# Patient Record
Sex: FEMALE | Race: BLACK OR AFRICAN AMERICAN
[De-identification: names, ages, dates, MRNs, and addresses within clinical notes are randomized per-mention and may not be internally consistent; named-entity substitution may affect disease eponyms.]

---

## 2017-06-15 NOTE — PDOC
History of Present Illness





- General


History Source: Patient


Exam Limitations: No Limitations





- History of Present Illness


Initial Comments: 





06/15/17 11:32


Patient is a 47-year-old female with history of asthma who presents with 

atraumatic, crampy, moderate to severe, intermittent left lower quadrant pain 

radiating to the left flank. Pain is 10 of 10 in the initial evaluation, not 

effectively controlled by ibuprofen. Patient denies nausea/vomiting/dysuria/

hematuria/vaginal bleeding. Patient has recently completed her regular 

menstrual period.Patient denies history of nephrolithiasis.





REVIEW OF SYSTEMS 





CONSTITUTIONAL: No fever, no chills, no fatigue


EYES: No visual changes


ENT: No ear pain, no sore throat


CARDIOVASCULAR: No chest pain, no palpitations


RESPIRATORY: No cough, no SOB


GI: + abdominal pain, no nausea, no vomiting, no constipation, no diarrhea


GENITOURINARY: No dysuria, no frequency, no hematuria


MUSKULOSKELETAL: No backpain, no joint pain, no myalgias


SKIN: No rash


NEURO: No headache 








EXAMINATION 





CONSTITUTIONAL: Well-appearing; well-nourished; in no apparent distress


HEAD: Normocephalic; atraumatic


EYES: PERRL; EOM intact 


ENMT: External appears normal; normal oropharynx


NECK: Supple; non-tender; no cervical lymphadenopathy


CARD: Normal S1, S2; no murmurs, rubs, or gallops


RESP: Normal chest excursion with respiration; breath sounds clear and equal 

bilaterally; no wheezes, rhonchi, or rales


ABD: Soft, non-distended; + mild llq and left flank tender; no palpable 

organomegaly, no palpable hernias


EXT: Normal ROM in all four extremities; non-tender to palpation; distal pulses 

intact


SKIN: Warm, dry, no rash


NEURO: No focal neurological deficiencies. 





<Bk Guerrero - Last Filed: 06/16/17 01:41>





<Rhonda Medrano - Last Filed: 06/16/17 01:52>





- General


Chief Complaint: Pain


Stated Complaint: PAIN, ACUTE


Time Seen by Provider: 06/15/17 21:40





Past History





- Past Medical History


Asthma: Yes





- Psycho/Social/Smoking Cessation Hx


Suicidal Ideation: No


Smoking History: Never smoked


Have you smoked in the past 12 months: No


Information on smoking cessation initiated: No





<Bk Guerrero - Last Filed: 06/16/17 01:41>





<Rhonda Medrano - Last Filed: 06/16/17 01:52>





- Past Medical History


Allergies/Adverse Reactions: 


 Allergies











Allergy/AdvReac Type Severity Reaction Status Date / Time


 


No Known Allergies Allergy   Verified 06/15/17 23:43











Home Medications: 


Ambulatory Orders





Albuterol 0.083% Nebulizer Sol [Ventolin 0.083%] 1 neb NEB Q4H 07/05/16 


Diazepam [Valium] 2 mg PO BID PRN #7 tablet MDD 2 07/05/16 


Fluticasone Propionate [Flovent Diskus] 100 mcg IH ASDIR 07/05/16 











*Physical Exam





- Vital Signs


 Last Vital Signs











Temp Pulse Resp BP Pulse Ox


 


 99.2 F   104 H  20   152/84   97 


 


 06/15/17 21:29  06/15/17 21:29  06/15/17 21:29  06/15/17 21:29  06/15/17 21:29














<Bk Guerrero - Last Filed: 06/16/17 01:41>





- Vital Signs


 Last Vital Signs











Temp Pulse Resp BP Pulse Ox


 


 99.2 F   104 H  20   152/84   97 


 


 06/15/17 21:29  06/15/17 21:29  06/15/17 21:29  06/15/17 21:29  06/15/17 21:29














<Rhonda Medrano - Last Filed: 06/16/17 01:52>





ED Treatment Course





- LABORATORY


CBC & Chemistry Diagram: 


 06/15/17 22:31





 06/15/17 22:31





<Bk Guerrero - Last Filed: 06/16/17 01:41>





- LABORATORY


CBC & Chemistry Diagram: 


 06/15/17 22:31





 06/15/17 22:31





- ADDITIONAL ORDERS


Additional order review: 


 Laboratory  Results











  06/15/17 06/15/17 06/15/17





  23:43 22:31 22:31


 


INR    1.11


 


Sodium   137 


 


Potassium   4.6 


 


Chloride   102 


 


Carbon Dioxide   26 


 


Anion Gap   9 


 


BUN   10 


 


Creatinine   0.8 


 


Creat Clearance w eGFR   > 60 


 


Random Glucose   99 


 


Calcium   8.7 


 


Total Bilirubin   0.5 


 


AST   17 


 


ALT   20 


 


Alkaline Phosphatase   96 


 


Total Protein   7.0 


 


Albumin   3.8 


 


Urine Color  Straw  


 


Urine Appearance  Clear  


 


Urine pH  6.0  


 


Urine Protein  Negative  


 


Urine Glucose (UA)  Negative  


 


Urine Ketones  1+ H  


 


Urine Blood  Negative  


 


Urine Nitrite  Negative  


 


Urine Bilirubin  Negative  


 


Urine Urobilinogen  Negative  


 


Ur Leukocyte Esterase  1+ H  


 


Urine RBC  <1  


 


Urine WBC  4  


 


Ur Epithelial Cells  Rare  


 


Urine Bacteria  Rare  


 


Hyaline Casts  1  


 


Urine Mucus  Rare  


 


Urine HCG, Qual  Negative  








 











  06/15/17





  22:31


 


RBC  4.23


 


MCV  84.9


 


MCHC  32.0


 


RDW  20.0 H


 


MPV  8.3


 


Neutrophils %  77.0


 


Lymphocytes %  12.4


 


Monocytes %  6.5


 


Eosinophils %  3.4


 


Basophils %  0.7














- RADIOLOGY


Radiograph Interpretation: 





06/16/17 01:51


: ([email or web address]) 


Report Date: 06/15/2017 23:23:00 


Report Status: Preliminary 


======================= Begin of Report Content ======================  


Referring Physician: Bk Guerrero 


Patient Name: Diamond Echeverria 


This is a preliminary report by imaging on call  


Exam: Renal sonogram  


Images: 30  Clinical indication:


Left flank pain. Rule out hydronephrosis.  


Findings: The right kidney has a normal appearance and echotexture measures 9 

cm in length. No parenchymal mass, shadowing calculus or hydronephrosis is 

seen.  The left kidney has a normal appearance and echotexture measures 9.8 cm 

in length. No parenchymal mass, shadowing calculus or hydronephrosis is seen.  


Impression: Normal appearance of both kidneys. 


THIS DOCUMENT HAS BEEN ELECTRONICALLY SIGNED 


Blayne Estrada M.D. 06/16/2017 00:58 EST 


M.D. Please call Imaging On Call [phone]) with questions.  


======================== End of Report Content ======================


 


: ([email or web address]) 


Report Date: 06/15/2017 23:11:00 


Report Status: Preliminary


======================= Begin of Report Content ======================  


Referring Physician: Bk Guerrero 


Patient Name: Diamond Echeverria 


This is a preliminary report by imaging on call  


Exam: Transabdominal pelvic sonogram and duplex sonography  


Images: 28  Clinical indication: 


Left flank pain. Left lower quadrant pain. Rule out cysts.  


Findings: The uterus is anteverted and measures 12.3 x 6.1 x 7.5 cm. A fibroid 

seen anteriorly on the right measures up to 2.8 cm in diameter.  The right 

ovary has a normal appearance and measures 2.2 x 2.2 x 1.3 cm and demonstrates 

arterial flow on color Doppler images.  The left ovary measures 2 x 2.5 x 1.3 

cm and demonstrates arterial flow on color Doppler images.  No free fluid seen.

  


Impression: Fibroid uterus. Normal appearance of the ovaries with normal 

vascular flow seen bilaterally. 


THIS DOCUMENT HAS BEEN ELECTRONICALLY SIGNED 


Blayne Estrada M.D. 06/16/2017 01:00 EST 


M.D. Please call Imaging On Call [phone]) with questions.  


======================== End of Report Content ======================








- Medications


Given in the ED: 


ED Medications














Discontinued Medications














Generic Name Dose Route Start Last Admin





  Trade Name Patsy  PRN Reason Stop Dose Admin


 


Sodium Chloride  1,000 mls @ 1,000 mls/hr 06/15/17 22:08 06/15/17 22:41





  Normal Saline -  IV 06/15/17 23:07  1,000 mls/hr





  ASDIR STA   Administration


 


Ketorolac Tromethamine  30 mg 06/15/17 22:09 06/15/17 22:41





  Toradol Injection -  IVPUSH 06/15/17 22:10  30 mg





  ONCE ONE   Administration














<Rhonda Medrano - Last Filed: 06/16/17 01:52>





Medical Decision Making





- Medical Decision Making





06/16/17 01:37


Patient is well-appearing 47-year-old female with history of asthma who 

presents with atraumatic crampy left lower quadrant pain radiating to the left 

flank. In the ER, patient is awake and alert, afebrile, hemodynamically stable. 

CBC/CMP/UA within normal limit. Renal and pelvic ultrasound showed no evidence 

of hydronephrosis, nephrolithiasis, there is no evidence of ovarian cyst. A 

uterine fibroid is normal and noted. Patient has received IV fluids as well as 

Toradol with complete resolution of her discomfort. On reevaluation, abdomen is 

soft, nontender, nondistended, there is no guarding rebound. Patient is able 

tolerate by mouth. This time, I do not believe patients presentation is 

consistent with acute abdomen and she can be safely discharged.





<Bk Guerrero - Last Filed: 06/16/17 01:41>





*DC/Admit/Observation/Transfer





<Bk Guerrero - Last Filed: 06/16/17 01:41>





<Rhonda Medrano - Last Filed: 06/16/17 01:52>


Diagnosis at time of Disposition: 


Abdominal pain


Qualifiers:


 Abdominal location: left lower quadrant Qualified Code(s): R10.32 - Left lower 

quadrant pain





- Discharge Dispostion


Disposition: HOME


Condition at time of disposition: Stable





- Referrals


Referrals: 


Nica Roberson MD [Primary Care Provider] - 


ob/gyn, one week [Other]





- Patient Instructions


Printed Discharge Instructions:  [facility] for Abdominal Pain-Adult

## 2022-11-14 ENCOUNTER — HOSPITAL ENCOUNTER (OUTPATIENT)
Dept: HOSPITAL 74 - FASU-ENDO | Age: 52
Discharge: HOME | End: 2022-11-14
Attending: INTERNAL MEDICINE
Payer: COMMERCIAL

## 2022-11-14 VITALS
TEMPERATURE: 97.9 F | SYSTOLIC BLOOD PRESSURE: 140 MMHG | RESPIRATION RATE: 19 BRPM | DIASTOLIC BLOOD PRESSURE: 78 MMHG | HEART RATE: 62 BPM

## 2022-11-14 VITALS — BODY MASS INDEX: 35 KG/M2

## 2022-11-14 DIAGNOSIS — Z12.11: Primary | ICD-10-CM

## 2022-11-14 DIAGNOSIS — K57.30: ICD-10-CM

## 2022-11-14 PROCEDURE — 0DJD8ZZ INSPECTION OF LOWER INTESTINAL TRACT, VIA NATURAL OR ARTIFICIAL OPENING ENDOSCOPIC: ICD-10-PCS | Performed by: INTERNAL MEDICINE
